# Patient Record
Sex: FEMALE | URBAN - METROPOLITAN AREA
[De-identification: names, ages, dates, MRNs, and addresses within clinical notes are randomized per-mention and may not be internally consistent; named-entity substitution may affect disease eponyms.]

---

## 2017-08-15 ENCOUNTER — OFFICE VISIT (OUTPATIENT)
Dept: URGENT CARE | Facility: URGENT CARE | Age: 47
End: 2017-08-15
Payer: COMMERCIAL

## 2017-08-15 VITALS
OXYGEN SATURATION: 99 % | TEMPERATURE: 98.4 F | WEIGHT: 183.1 LBS | RESPIRATION RATE: 18 BRPM | DIASTOLIC BLOOD PRESSURE: 78 MMHG | SYSTOLIC BLOOD PRESSURE: 124 MMHG | HEART RATE: 77 BPM

## 2017-08-15 DIAGNOSIS — J01.00 ACUTE MAXILLARY SINUSITIS, RECURRENCE NOT SPECIFIED: Primary | ICD-10-CM

## 2017-08-15 PROCEDURE — 99202 OFFICE O/P NEW SF 15 MIN: CPT | Performed by: FAMILY MEDICINE

## 2017-08-15 RX ORDER — CEFDINIR 300 MG/1
300 CAPSULE ORAL 2 TIMES DAILY
Qty: 20 CAPSULE | Refills: 0 | Status: SHIPPED | OUTPATIENT
Start: 2017-08-15

## 2017-08-15 NOTE — NURSING NOTE
Chief Complaint   Patient presents with     Urgent Care     Sinus Problem       Initial /78  Pulse 77  Temp 98.4  F (36.9  C) (Oral)  Resp 18  Wt 183 lb 1.6 oz (83.1 kg)  SpO2 99% There is no height or weight on file to calculate BMI.  Medication Reconciliation: complete       Lashonda Tran CMA

## 2017-08-15 NOTE — MR AVS SNAPSHOT
"              After Visit Summary   8/15/2017    Tiffanie Schreiber    MRN: [id number]           Patient Information     Date Of Birth          1970        Visit Information        Provider Department      8/15/2017 5:15 PM Quirino Lopez MD [facility]        Today's Diagnoses     Acute maxillary sinusitis, recurrence not specified    -  1       Follow-ups after your visit        Who to contact     If you have questions or need follow up information about today's clinic visit or your schedule please contact [facility] directly at [phone].  Normal or non-critical lab and imaging results will be communicated to you by Pockethart, letter or phone within 4 business days after the clinic has received the results. If you do not hear from us within 7 days, please contact the clinic through Pockethart or phone. If you have a critical or abnormal lab result, we will notify you by phone as soon as possible.  Submit refill requests through Liquipel or call your pharmacy and they will forward the refill request to us. Please allow 3 business days for your refill to be completed.          Additional Information About Your Visit        MyChart Information     Liquipel lets you send messages to your doctor, view your test results, renew your prescriptions, schedule appointments and more. To sign up, go to [email or web address] . Click on \"Log in\" on the left side of the screen, which will take you to the Welcome page. Then click on \"Sign up Now\" on the right side of the page.     You will be asked to enter the access code listed below, as well as some personal information. Please follow the directions to create your username and password.     Your access code is: [email or web address]  Expires: 2017  5:40 PM     Your access code will  in 90 days. If you need help or a new code, please call your [facility] clinic or [phone].        Care EveryWhere ID     This is your Care EveryWhere " ID. This could be used by other organizations to access your [facility] medical records  [id number]        Your Vitals Were     Pulse Temperature Respirations Pulse Oximetry          77 98.4  F (36.9  C) (Oral) 18 99%         Blood Pressure from Last 3 Encounters:   08/15/17 124/78    Weight from Last 3 Encounters:   08/15/17 183 lb 1.6 oz (83.1 kg)              Today, you had the following     No orders found for display         Today's Medication Changes          These changes are accurate as of: 8/15/17  5:40 PM.  If you have any questions, ask your nurse or doctor.               Start taking these medicines.        Dose/Directions    cefdinir 300 MG capsule   Commonly known as:  OMNICEF   Used for:  Acute maxillary sinusitis, recurrence not specified   Started by:  Quirino Lopez MD        Dose:  300 mg   Take 1 capsule (300 mg) by mouth 2 times daily   Quantity:  20 capsule   Refills:  0            Where to get your medicines      These medications were sent to [facility] [address] [place] [address] AT [address]  [address] [place] [id number]     Phone:  [phone]     cefdinir 300 MG capsule                Primary Care Provider    None Specified       No primary provider on file.        Equal Access to Services     DANNIELLE SOTOMAYOR AH: Leonel coheno Soleonardoali, waaxda luqadaha, qaybta kaalmada adeegyada, salina vazquez. So [facility] [phone].    ATENCIÓN: Si habla español, tiene a al disposición servicios gratuitos de asistencia lingüística. Llame al [phone].    We comply with applicable federal civil rights laws and [place] laws. We do not discriminate on the basis of race, color, national origin, age, disability sex, sexual orientation or gender identity.            Thank you!     Thank you for choosing [facility]  for your care. Our goal is always to provide you with excellent care. Hearing back from our  patients is one way we can continue to improve our services. Please take a few minutes to complete the written survey that you may receive in the mail after your visit with us. Thank you!             Your Updated Medication List - Protect others around you: Learn how to safely use, store and throw away your medicines at www.disposemymeds.org.          This list is accurate as of: 8/15/17  5:40 PM.  Always use your most recent med list.                   Brand Name Dispense Instructions for use Diagnosis    cefdinir 300 MG capsule    OMNICEF    20 capsule    Take 1 capsule (300 mg) by mouth 2 times daily    Acute maxillary sinusitis, recurrence not specified

## 2017-08-15 NOTE — PROGRESS NOTES
SUBJECTIVE:                                                    Tiffanie Schreiber is a 47 year old female who presents to clinic today for the following health issues:      RESPIRATORY SYMPTOMS      Duration: 4-5 days    Description  Nasal  congestion, teeth pain     Severity: moderate    Accompanying signs and symptoms: None    History (predisposing factors):  none    Precipitating or alleviating factors: None    Therapies tried and outcome:  None         OBJECTIVE: The patient appears alert and mild distress.   EARS: External ears normal. Canals clear. TM's normal.  NOSE/SINUS: inferior turbs inflarged . Positive drainage. And positive tenderness to palpation  Sinus palpation: Maxillary sinus tender to palpation   THROAT: mild erythema   NECK:positive findings: moderate anterior cervical nodes   CHEST: Clear    ASSESSMENT: Acute Sinusitis    PLAN: See orders.   In addition, I have suggested that the patient   Push fluids.